# Patient Record
Sex: FEMALE | Race: WHITE | NOT HISPANIC OR LATINO | Employment: STUDENT | URBAN - NONMETROPOLITAN AREA
[De-identification: names, ages, dates, MRNs, and addresses within clinical notes are randomized per-mention and may not be internally consistent; named-entity substitution may affect disease eponyms.]

---
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
## 2020-06-23 PROCEDURE — 99285 EMERGENCY DEPT VISIT HI MDM: CPT

## 2020-06-23 PROCEDURE — 99284 EMERGENCY DEPT VISIT MOD MDM: CPT

## 2020-06-24 ENCOUNTER — HOSPITAL ENCOUNTER (EMERGENCY)
Facility: HOSPITAL | Age: 20
Discharge: HOME | End: 2020-06-24
Admitting: FAMILY MEDICINE

## 2020-06-24 ENCOUNTER — APPOINTMENT (OUTPATIENT)
Dept: GENERAL RADIOLOGY | Facility: HOSPITAL | Age: 20
End: 2020-06-24

## 2020-06-24 VITALS
OXYGEN SATURATION: 100 % | TEMPERATURE: 98.4 F | HEIGHT: 62 IN | DIASTOLIC BLOOD PRESSURE: 55 MMHG | RESPIRATION RATE: 20 BRPM | SYSTOLIC BLOOD PRESSURE: 102 MMHG | HEART RATE: 84 BPM | WEIGHT: 107 LBS | BODY MASS INDEX: 19.69 KG/M2

## 2020-06-24 DIAGNOSIS — R55 POSTURAL SYNCOPE: Primary | ICD-10-CM

## 2020-06-24 LAB
A-A DO2: 0.5 MMHG (ref 0–300)
ALBUMIN SERPL-MCNC: 4.58 G/DL (ref 3.5–5.2)
ALBUMIN/GLOB SERPL: 1.5 G/DL
ALP SERPL-CCNC: 58 U/L (ref 39–117)
ALT SERPL W P-5'-P-CCNC: 37 U/L (ref 1–33)
ANION GAP SERPL CALCULATED.3IONS-SCNC: 14.8 MMOL/L (ref 5–15)
ARTERIAL PATENCY WRIST A: ABNORMAL
AST SERPL-CCNC: 26 U/L (ref 1–32)
ATMOSPHERIC PRESS: 724 MMHG
BACTERIA UR QL AUTO: ABNORMAL /HPF
BASE EXCESS BLDA CALC-SCNC: -3.4 MMOL/L (ref 0–2)
BASOPHILS # BLD AUTO: 0.06 10*3/MM3 (ref 0–0.2)
BASOPHILS NFR BLD AUTO: 0.4 % (ref 0–1.5)
BDY SITE: ABNORMAL
BILIRUB SERPL-MCNC: 0.3 MG/DL (ref 0.2–1.2)
BILIRUB UR QL STRIP: NEGATIVE
BODY TEMPERATURE: 0 C
BUN BLD-MCNC: 8 MG/DL (ref 6–20)
BUN/CREAT SERPL: 16.7 (ref 7–25)
CALCIUM SPEC-SCNC: 9.8 MG/DL (ref 8.6–10.5)
CHLORIDE SERPL-SCNC: 100 MMOL/L (ref 98–107)
CLARITY UR: ABNORMAL
CO2 BLDA-SCNC: 18.7 MMOL/L (ref 22–33)
CO2 SERPL-SCNC: 20.2 MMOL/L (ref 22–29)
COHGB MFR BLD: 0.4 % (ref 0–5)
COLOR UR: YELLOW
CREAT BLD-MCNC: 0.48 MG/DL (ref 0.57–1)
DEPRECATED RDW RBC AUTO: 40.8 FL (ref 37–54)
EOSINOPHIL # BLD AUTO: 0.04 10*3/MM3 (ref 0–0.4)
EOSINOPHIL NFR BLD AUTO: 0.3 % (ref 0.3–6.2)
ERYTHROCYTE [DISTWIDTH] IN BLOOD BY AUTOMATED COUNT: 12.4 % (ref 12.3–15.4)
GFR SERPL CREATININE-BSD FRML MDRD: >150 ML/MIN/1.73
GLOBULIN UR ELPH-MCNC: 3.1 GM/DL
GLUCOSE BLD-MCNC: 86 MG/DL (ref 65–99)
GLUCOSE UR STRIP-MCNC: NEGATIVE MG/DL
HCO3 BLDA-SCNC: 18 MMOL/L (ref 20–26)
HCT VFR BLD AUTO: 35.7 % (ref 34–46.6)
HCT VFR BLD CALC: 37 % (ref 38–51)
HGB BLD-MCNC: 12 G/DL (ref 12–15.9)
HGB BLDA-MCNC: 12.1 G/DL (ref 13.5–17.5)
HGB UR QL STRIP.AUTO: NEGATIVE
HOROWITZ INDEX BLD+IHG-RTO: 21 %
HYALINE CASTS UR QL AUTO: ABNORMAL /LPF
IMM GRANULOCYTES # BLD AUTO: 0.06 10*3/MM3 (ref 0–0.05)
IMM GRANULOCYTES NFR BLD AUTO: 0.4 % (ref 0–0.5)
KETONES UR QL STRIP: ABNORMAL
LEUKOCYTE ESTERASE UR QL STRIP.AUTO: ABNORMAL
LYMPHOCYTES # BLD AUTO: 2.79 10*3/MM3 (ref 0.7–3.1)
LYMPHOCYTES NFR BLD AUTO: 20.6 % (ref 19.6–45.3)
Lab: ABNORMAL
MCH RBC QN AUTO: 30.1 PG (ref 26.6–33)
MCHC RBC AUTO-ENTMCNC: 33.6 G/DL (ref 31.5–35.7)
MCV RBC AUTO: 89.5 FL (ref 79–97)
METHGB BLD QL: 0.4 % (ref 0–3)
MODALITY: ABNORMAL
MONOCYTES # BLD AUTO: 0.71 10*3/MM3 (ref 0.1–0.9)
MONOCYTES NFR BLD AUTO: 5.2 % (ref 5–12)
NEUTROPHILS # BLD AUTO: 9.9 10*3/MM3 (ref 1.7–7)
NEUTROPHILS NFR BLD AUTO: 73.1 % (ref 42.7–76)
NITRITE UR QL STRIP: NEGATIVE
NOTE: ABNORMAL
NRBC BLD AUTO-RTO: 0 /100 WBC (ref 0–0.2)
OXYHGB MFR BLDV: 98.4 % (ref 94–99)
PCO2 BLDA: 22.7 MM HG (ref 35–45)
PCO2 TEMP ADJ BLD: ABNORMAL MM[HG]
PH BLDA: 7.51 PH UNITS (ref 7.35–7.45)
PH UR STRIP.AUTO: 8.5 [PH] (ref 5–8)
PH, TEMP CORRECTED: ABNORMAL
PLATELET # BLD AUTO: 310 10*3/MM3 (ref 140–450)
PMV BLD AUTO: 9.4 FL (ref 6–12)
PO2 BLDA: 116 MM HG (ref 83–108)
PO2 TEMP ADJ BLD: ABNORMAL MM[HG]
POTASSIUM BLD-SCNC: 3.3 MMOL/L (ref 3.5–5.2)
PROT SERPL-MCNC: 7.7 G/DL (ref 6–8.5)
PROT UR QL STRIP: NEGATIVE
RBC # BLD AUTO: 3.99 10*6/MM3 (ref 3.77–5.28)
RBC # UR: ABNORMAL /HPF
REF LAB TEST METHOD: ABNORMAL
SAO2 % BLDCOA: 99.2 % (ref 94–99)
SODIUM BLD-SCNC: 135 MMOL/L (ref 136–145)
SP GR UR STRIP: 1.03 (ref 1–1.03)
SQUAMOUS #/AREA URNS HPF: ABNORMAL /HPF
TROPONIN T SERPL-MCNC: <0.01 NG/ML (ref 0–0.03)
UROBILINOGEN UR QL STRIP: ABNORMAL
VENTILATOR MODE: ABNORMAL
WBC NRBC COR # BLD: 13.56 10*3/MM3 (ref 3.4–10.8)
WBC UR QL AUTO: ABNORMAL /HPF

## 2020-06-24 PROCEDURE — 93010 ELECTROCARDIOGRAM REPORT: CPT | Performed by: INTERNAL MEDICINE

## 2020-06-24 PROCEDURE — 81001 URINALYSIS AUTO W/SCOPE: CPT | Performed by: NURSE PRACTITIONER

## 2020-06-24 PROCEDURE — 93005 ELECTROCARDIOGRAM TRACING: CPT | Performed by: NURSE PRACTITIONER

## 2020-06-24 PROCEDURE — 36600 WITHDRAWAL OF ARTERIAL BLOOD: CPT

## 2020-06-24 PROCEDURE — 82805 BLOOD GASES W/O2 SATURATION: CPT

## 2020-06-24 PROCEDURE — 82375 ASSAY CARBOXYHB QUANT: CPT

## 2020-06-24 PROCEDURE — 85025 COMPLETE CBC W/AUTO DIFF WBC: CPT | Performed by: NURSE PRACTITIONER

## 2020-06-24 PROCEDURE — 83050 HGB METHEMOGLOBIN QUAN: CPT

## 2020-06-24 PROCEDURE — 84484 ASSAY OF TROPONIN QUANT: CPT | Performed by: NURSE PRACTITIONER

## 2020-06-24 PROCEDURE — 80053 COMPREHEN METABOLIC PANEL: CPT | Performed by: NURSE PRACTITIONER

## 2020-06-24 RX ORDER — SODIUM CHLORIDE 0.9 % (FLUSH) 0.9 %
10 SYRINGE (ML) INJECTION AS NEEDED
Status: DISCONTINUED | OUTPATIENT
Start: 2020-06-24 | End: 2020-06-24 | Stop reason: HOSPADM

## 2020-06-24 RX ADMIN — SODIUM CHLORIDE 1000 ML: 9 INJECTION, SOLUTION INTRAVENOUS at 03:33

## 2020-06-24 NOTE — ED NOTES
EKG performed at this time, given to Dr. Blevins for review.     Alyssa Cottrell RN  06/24/20 0402

## 2020-06-24 NOTE — ED NOTES
Provider at bedside at this time updating pt on results and plan to discharge home, all questions and concerns addressed, understanding verbalized.     Leyla Berry, RNA  06/24/20 0442

## 2020-06-24 NOTE — ED NOTES
Per provider, pt is to finish fluid bolus prior to discharge.     Alyssa Cottrell RN  06/24/20 0500

## 2020-06-24 NOTE — ED PROVIDER NOTES
Subjective   The patient is a 19 year old female presenting to ED for complaint of syncopal episodes. She says she is 13 weeks pregnant. She has been having episodes of fatigue. She denies any injury during syncopal episodes, she says she lays in bed before she actually passes out. She denies any pain, she says when this happens she has palpitations.       History provided by:  Patient   used: No    Syncope   Episode history:  Multiple  Most recent episode:  Today  Timing:  Intermittent  Progression:  Waxing and waning  Chronicity:  Recurrent  Associated symptoms: malaise/fatigue, palpitations and shortness of breath    Risk factors: no congenital heart disease, no coronary artery disease, no seizures and no vascular disease        Review of Systems   Constitutional: Positive for fatigue and malaise/fatigue.   HENT: Negative.    Eyes: Negative.    Respiratory: Positive for shortness of breath.    Cardiovascular: Positive for palpitations and syncope.   Endocrine: Negative.    Genitourinary: Negative.    Musculoskeletal: Negative.    Skin: Negative.    Allergic/Immunologic: Negative.    Neurological: Positive for syncope.   Hematological: Negative.    Psychiatric/Behavioral: The patient is nervous/anxious.    All other systems reviewed and are negative.      No past medical history on file.    Allergies   Allergen Reactions   • Morphine And Related    • Bactrim [Sulfamethoxazole-Trimethoprim] Rash     Eye swelling        Past Surgical History:   Procedure Laterality Date   • ENDOSCOPY         No family history on file.    Social History     Socioeconomic History   • Marital status: Single     Spouse name: Not on file   • Number of children: Not on file   • Years of education: Not on file   • Highest education level: Not on file   Tobacco Use   • Smoking status: Never Smoker           Objective   Physical Exam   Constitutional: She is oriented to person, place, and time. She appears well-developed  and well-nourished.   HENT:   Head: Normocephalic.   Mouth/Throat: Oropharynx is clear and moist.   Eyes: Pupils are equal, round, and reactive to light.   Neck: Normal range of motion.   Cardiovascular: Normal rate, regular rhythm, normal heart sounds and intact distal pulses.   Pulmonary/Chest: Effort normal.   Abdominal: Soft. Bowel sounds are normal.   Musculoskeletal: Normal range of motion.        Right lower leg: Normal.        Left lower leg: Normal.   Neurological: She is alert and oriented to person, place, and time.   Skin: Skin is warm and dry.   Psychiatric: She has a normal mood and affect. Her behavior is normal.   Nursing note and vitals reviewed.      Procedures           ED Course  ED Course as of Jun 24 0439 Wed Jun 24, 2020   0339 Patient declines CXR due to pregnancy    [KK]   0436 EKG reviewed by Dr. Blevins normal sinus rhythm ventricular rate 74 UT interval 142 ms QRS duration 88 ms QT/QTc 394/437 ms no evidence of acute ischemia no change compared to previous EKG   ECG 12 Lead [KK]      ED Course User Index  [KK] Manuel Ruffin APRN                                           MDM  Number of Diagnoses or Management Options  Postural syncope: new and requires workup     Amount and/or Complexity of Data Reviewed  Clinical lab tests: ordered and reviewed  Tests in the medicine section of CPT®: ordered and reviewed    Risk of Complications, Morbidity, and/or Mortality  Presenting problems: moderate  Diagnostic procedures: moderate  Management options: moderate  General comments: Labs reviewed and unremarkable  Vital signs stable  Patient afebrile  EKG reviewed and is normal  Patient to follow-up closely with OB/GYN    Patient Progress  Patient progress: improved      Final diagnoses:   Postural syncope            Manuel Ruffin APRN  06/24/20 043

## 2024-11-14 NOTE — PROGRESS NOTES
Subjective     Date: 11/19/2024    Referring Provider  MARLI Courtney    Chief Complaint  Iron deficiency     Subjective          Brittany Truong is a 24 y.o. female who presents today to [facility] for initial evaluation .    HPI:   24 y.o.female presents for consultation due to history of iron deficiency anemia.    Patient reports history of iron deficiency anemia for many years, not requiring IV iron or transfusion. Has required oral iron during pregnancies, more recently has been taking oral ferrous sulfate 325 mg QD daily. Associated with nausea, constipation, and abdominal cramping.     Her iron deficiency has been attributed to menstrual cycle, recently had IUD placed with control of her menstrual cycles. Has monthly menses lasting 3-5 days, light bleeding. Denies any evidence of GIB.    Had endoscopy when she was younger, unable to recall specifics and the year. Admits to having pagophagia throughout the day, fatigue, dyspnea on exertion.    Non smoker, denies alcohol or drug use. Family history significant for mother with ovarian and breast cancer (did not have genetic testing done). Works as a RN at [employer].           The following portions of the patient's history were reviewed and updated as appropriate: allergies, current medications, past family history, past medical history, past social history, past surgical history and problem list.    Objective     Objective     Allergy:   Allergies   Allergen Reactions    Promethazine-Phenylephrine Itching    Dexamethasone Sod Phos-Bupiv Unknown - Low Severity     Syncope, seizure activity    Morphine And Codeine     Bactrim [Sulfamethoxazole-Trimethoprim] Rash     Eye swelling         Current Medications:   Current Outpatient Medications   Medication Sig Dispense Refill    Cariprazine HCl (Vraylar) 1.5 MG capsule capsule Take 1 capsule by mouth Daily.      ferrous sulfate 325 (65 FE) MG tablet Take 1  "tablet by mouth Daily With Breakfast.      sertraline (ZOLOFT) 25 MG tablet Take 1 tablet by mouth Daily.       No current facility-administered medications for this visit.       Past Medical History:  Past Medical History:   Diagnosis Date    Anemia     Depression     Ovarian cyst        Past Surgical History:  Past Surgical History:   Procedure Laterality Date     SECTION      ENDOSCOPY      KNEE SURGERY      TONSILLECTOMY Bilateral     WISDOM TOOTH EXTRACTION         Social History:  Social History     Socioeconomic History    Marital status: Single   Tobacco Use    Smoking status: Never   Vaping Use    Vaping status: Never Used   Substance and Sexual Activity    Alcohol use: Never    Drug use: Never    Sexual activity: Defer     Partners: Female         Family History:  Family History   Problem Relation Age of Onset    Cancer Mother     Hypertension Mother     Hypertension Father        Review of Systems:  Review of Systems   Respiratory:  Positive for shortness of breath.    All other systems reviewed and are negative.      Vital Signs:   /78   Pulse 93   Temp 98 °F (36.7 °C) (Temporal)   Resp 20   Ht 157.5 cm (62\")   Wt 65.8 kg (145 lb)   SpO2 99%   BMI 26.52 kg/m²      Physical Exam:  Physical Exam  Vitals reviewed.   Constitutional:       General: She is not in acute distress.     Appearance: Normal appearance. She is not ill-appearing.   HENT:      Head: Normocephalic and atraumatic.      Mouth/Throat:      Mouth: Mucous membranes are moist.      Pharynx: Oropharynx is clear.   Eyes:      Conjunctiva/sclera: Conjunctivae normal.      Pupils: Pupils are equal, round, and reactive to light.   Cardiovascular:      Rate and Rhythm: Normal rate and regular rhythm.      Heart sounds: No murmur heard.  Pulmonary:      Effort: Pulmonary effort is normal. No respiratory distress.      Breath sounds: Normal breath sounds. No wheezing.   Abdominal:      General: Abdomen is flat. Bowel sounds are " "normal. There is no distension.      Palpations: Abdomen is soft. There is no mass.      Tenderness: There is no abdominal tenderness. There is no guarding.   Musculoskeletal:         General: No swelling. Normal range of motion.      Cervical back: Normal range of motion.   Lymphadenopathy:      Cervical: No cervical adenopathy.   Skin:     General: Skin is warm and dry.   Neurological:      General: No focal deficit present.      Mental Status: She is alert and oriented to person, place, and time. Mental status is at baseline.   Psychiatric:         Mood and Affect: Mood normal.         PHQ-9 Score  PHQ-9 Total Score:       Pain Score  Vitals:    11/19/24 1425   BP: 116/78   Pulse: 93   Resp: 20   Temp: 98 °F (36.7 °C)   TempSrc: Temporal   SpO2: 99%   Weight: 65.8 kg (145 lb)   Height: 157.5 cm (62\")   PainSc: 0-No pain       ECOG score: 0           PAINSCOREQUALITYMETRIC:   Vitals:    11/19/24 1425   PainSc: 0-No pain          Lab Review  Scanned Labs   11/5/2024            Lab Results   Component Value Date    WBC 8.51 11/19/2024    HGB 13.3 11/19/2024    HCT 41.1 11/19/2024    MCV 88.4 11/19/2024    RDW 12.5 11/19/2024     11/19/2024    NEUTRORELPCT 69.7 11/19/2024    LYMPHORELPCT 21.6 11/19/2024    MONORELPCT 6.3 11/19/2024    EOSRELPCT 1.2 11/19/2024    BASORELPCT 0.8 11/19/2024    NEUTROABS 5.93 11/19/2024    LYMPHSABS 1.84 11/19/2024     Lab Results   Component Value Date     (L) 06/24/2020    K 3.3 (L) 06/24/2020    CO2 20.2 (L) 06/24/2020     06/24/2020    BUN 8 06/24/2020    CREATININE 0.48 (L) 06/24/2020    EGFRIFNONA >150 06/24/2020    EGFRIFAFRI  11/29/2017      Comment:      Unable to calculate GFR, patient age <=18.    GLUCOSE 86 06/24/2020    CALCIUM 9.8 06/24/2020    ALKPHOS 58 06/24/2020    AST 26 06/24/2020    ALT 37 (H) 06/24/2020    BILITOT 0.3 06/24/2020    ALBUMIN 4.58 06/24/2020    PROTEINTOT 7.7 06/24/2020       Radiology Results  CT Abdomen Pelvis With Contrast " (11/29/2017 21:51)   FINDINGS:  LOWER THORAX: Clear. No effusions.  ABDOMEN:     LIVER: NONSPECIFIC PERIPORTAL EDEMA OTHERWISE HOMOGENEOUS APPEARANCE  OF LIVER.     GALLBLADDER: NONDISTENDED OR CONTRACTED APPEARANCE OF THE GALLBLADDER  IS NOTED     PANCREAS: Unremarkable. No mass or ductal dilatation.     SPLEEN: Homogeneous. No splenomegaly.     ADRENALS: No mass.     KIDNEYS: No mass. No obstructive uropathy.  No evidence of  urolithiasis.     GI TRACT: Non-dilated. No definite wall thickening.     PERITONEUM: FREE FLUID IS SEEN THROUGHOUT THE LOWER PELVIS. THIS  COULD BE PHYSIOLOGIC IN ETIOLOGY OR SECONDARY TO NONSPECIFIC  INFLAMMATORY PROCESS OF THE ABDOMEN.     MESENTERY: Unremarkable.     LYMPH NODES: No lymphadenopathy.     VASCULATURE: No evidence of aneurysm.     ABDOMINAL WALL: No focal hernia or mass.     OTHER: None.  PELVIS:     BLADDER: MILD WALL THICKENING OF URINARY BLADDER POSSIBLY CYSTITIS.     REPRODUCTIVE: Unremarkable as visualized.     APPENDIX: Nondistended. No surrounding inflammation.     BONES: No acute bony abnormality.     IMPRESSION:  1. GALLBLADDER WALL THICKENING BUT INCOMPLETE DISTENTION. THIS COULD BE  SECONDARY TO NONSPECIFIC INFLAMMATORY PROCESS WITHIN THE ABDOMEN.  CONSIDER FOLLOW-UP WITH ULTRASOUND TO EXCLUDE CHOLECYSTITIS.  2. MILD PERIPORTAL EDEMA INVOLVING LIVER WHICH CAN ALSO BE SECONDARY TO  NONSPECIFIC INTRA-ABDOMINAL INFLAMMATORY PROCESS.  3. FREE FLUID LOWER PELVIS MAY BE PHYSIOLOGIC.  4. OTHERWISE UNREMARKABLE EXAM.        Pathology:   EGD (07/11/2018)  FINDINGS:   The  radiograph shows bowel gas present in a nonobstructive pattern. There is no evidence of   abnormal calcification, organomegaly, or abdominal mass. The osseous structures are normal.   Swallowing is grossly normal. The esophagus shows normal contour, caliber and motility. The stomach   appears normal. The duodenal sweep is normal in configuration. However, there is a round   well-circumscribed  "smoothly marginated nearly 2 cm diverticulum arising from the right border of the    third-fourth duodenal junction, lying right of the spine at the L1-L2 level. The duodenojejunal   junction is normal in position. No gastroesophageal reflux occurred during the examination.     IMPRESSION:  1.  No findings of SMA syndrome or other anatomic abnormality leading to obstruction.   2.  Nearly 2 cm diverticulum arising from the junction of the third and fourth duodenum.     Endoscopy:     Assessment / Plan         Assessment and Plan   Brittany Truong is a 24 y.o. presents for     Anemia   Iron deficiency   Malabsorption   -Patient reports years of iron deficiency anemia, more recently has been on oral ferrous sulfate 325 mg daily. Associated with fatigue, shortness of breath on exertion, and pagophagia.   -Denies previous IV iron or blood transfusions.   -Previously attributed to menorrhagia, has IUD in place with \"light\" bleeding of cycles lasting 3-5 days.   -Denies GIB. Last endoscopy in 2018  -We discussed that the differential for anemia is pretty broad.  It could be related to iron deficiency, chronic inflammation or due to chronic disease processes, vitamin deficiencies, hemolysis, or other underlying bone marrow disorder  -Check CBC with differential, peripheral smear, iron profile, ferritin, folate level, vitamin B12, methylmalonic acid, LDH, reticulocytosis, haptoglobin, TSH  -given patient has had difficulty with PATRICE, despite being on oral iron for >1 year. Would recommend parenteral iron at this time, will order for Monoferric        Discussed possible differential diagnoses, testing, treatment, recommended non-pharmacological interventions, risks, warning signs to monitor for that would indicate need for follow-up in clinic or ER. If no improvement with these regimens or you have new or worsening symptoms follow-up. Patient verbalizes understanding and agreement with plan of care. Denies further needs or " concerns.     Patient was given instructions and counseling regarding her condition and for health maintenance advised.       All questions were answered to her satisfaction.    BMI cannot be calculated due to outdated height or weight values.  Please input a current height/weight in Vitals and re-renter BMIFOLLOWUP in Note to pull in correct documentation based on BMI range.         ACO / MAGO/Other  Quality measures  -  Brittany Truong did not receive 2024 flu vaccine.  This was recommended.  -  Brittany Truong reports a pain score of 0.  Given her pain assessment as noted, treatment options were discussed and the following options were decided upon as a follow-up plan to address the patient's pain: continuation of current treatment plan for pain.  -  Current outpatient and discharge medications have been reconciled for the patient.  Reviewed by: Jesenia Jasso MD        Meds ordered during this visit  No orders of the defined types were placed in this encounter.      Visit Diagnoses    ICD-10-CM ICD-9-CM   1. Anemia, unspecified type  D64.9 285.9   2. Malabsorption due to intolerance, not elsewhere classified  K90.49 579.8   3. Iron deficiency anemia, unspecified iron deficiency anemia type  D50.9 280.9       Follow Up   In 3 months with CBC, Iron studies, B12, folate, ferritin, STR        This document has been electronically signed by Jesenia Jasso MD   November 19, 2024 15:37 EST    Dictated Utilizing Dragon Dictation: Part of this note may be an electronic transcription/translation of spoken language to printed text using the Dragon Dictation System.

## 2024-11-19 ENCOUNTER — CONSULT (OUTPATIENT)
Dept: ONCOLOGY | Facility: CLINIC | Age: 24
End: 2024-11-19
Payer: COMMERCIAL

## 2024-11-19 ENCOUNTER — LAB (OUTPATIENT)
Dept: ONCOLOGY | Facility: CLINIC | Age: 24
End: 2024-11-19
Payer: COMMERCIAL

## 2024-11-19 VITALS
HEART RATE: 93 BPM | RESPIRATION RATE: 20 BRPM | WEIGHT: 145 LBS | SYSTOLIC BLOOD PRESSURE: 116 MMHG | TEMPERATURE: 98 F | OXYGEN SATURATION: 99 % | HEIGHT: 62 IN | DIASTOLIC BLOOD PRESSURE: 78 MMHG | BODY MASS INDEX: 26.68 KG/M2

## 2024-11-19 DIAGNOSIS — D64.9 ANEMIA, UNSPECIFIED TYPE: ICD-10-CM

## 2024-11-19 DIAGNOSIS — D64.9 ANEMIA, UNSPECIFIED TYPE: Primary | ICD-10-CM

## 2024-11-19 DIAGNOSIS — D50.9 IRON DEFICIENCY ANEMIA, UNSPECIFIED IRON DEFICIENCY ANEMIA TYPE: ICD-10-CM

## 2024-11-19 DIAGNOSIS — K90.49 MALABSORPTION DUE TO INTOLERANCE, NOT ELSEWHERE CLASSIFIED: ICD-10-CM

## 2024-11-19 LAB
ALBUMIN SERPL-MCNC: 4.7 G/DL (ref 3.5–5.2)
ALBUMIN/GLOB SERPL: 1.4 G/DL
ALP SERPL-CCNC: 103 U/L (ref 39–117)
ALT SERPL W P-5'-P-CCNC: 60 U/L (ref 1–33)
ANION GAP SERPL CALCULATED.3IONS-SCNC: 11.4 MMOL/L (ref 5–15)
AST SERPL-CCNC: 25 U/L (ref 1–32)
BASOPHILS # BLD AUTO: 0.07 10*3/MM3 (ref 0–0.2)
BASOPHILS NFR BLD AUTO: 0.8 % (ref 0–1.5)
BILIRUB SERPL-MCNC: 0.3 MG/DL (ref 0–1.2)
BUN SERPL-MCNC: 12 MG/DL (ref 6–20)
BUN/CREAT SERPL: 16.7 (ref 7–25)
CALCIUM SPEC-SCNC: 9.7 MG/DL (ref 8.6–10.5)
CHLORIDE SERPL-SCNC: 104 MMOL/L (ref 98–107)
CO2 SERPL-SCNC: 23.6 MMOL/L (ref 22–29)
CREAT SERPL-MCNC: 0.72 MG/DL (ref 0.57–1)
DEPRECATED RDW RBC AUTO: 40.3 FL (ref 37–54)
EGFRCR SERPLBLD CKD-EPI 2021: 119.2 ML/MIN/1.73
EOSINOPHIL # BLD AUTO: 0.1 10*3/MM3 (ref 0–0.4)
EOSINOPHIL NFR BLD AUTO: 1.2 % (ref 0.3–6.2)
ERYTHROCYTE [DISTWIDTH] IN BLOOD BY AUTOMATED COUNT: 12.5 % (ref 12.3–15.4)
FERRITIN SERPL-MCNC: 29.95 NG/ML (ref 13–150)
GLOBULIN UR ELPH-MCNC: 3.3 GM/DL
GLUCOSE SERPL-MCNC: 93 MG/DL (ref 65–99)
HCT VFR BLD AUTO: 41.1 % (ref 34–46.6)
HGB BLD-MCNC: 13.3 G/DL (ref 12–15.9)
IMM GRANULOCYTES # BLD AUTO: 0.03 10*3/MM3 (ref 0–0.05)
IMM GRANULOCYTES NFR BLD AUTO: 0.4 % (ref 0–0.5)
IRON 24H UR-MRATE: 76 MCG/DL (ref 37–145)
IRON SATN MFR SERPL: 16 % (ref 20–50)
LDH SERPL-CCNC: 223 U/L (ref 135–214)
LYMPHOCYTES # BLD AUTO: 1.84 10*3/MM3 (ref 0.7–3.1)
LYMPHOCYTES NFR BLD AUTO: 21.6 % (ref 19.6–45.3)
MCH RBC QN AUTO: 28.6 PG (ref 26.6–33)
MCHC RBC AUTO-ENTMCNC: 32.4 G/DL (ref 31.5–35.7)
MCV RBC AUTO: 88.4 FL (ref 79–97)
MONOCYTES # BLD AUTO: 0.54 10*3/MM3 (ref 0.1–0.9)
MONOCYTES NFR BLD AUTO: 6.3 % (ref 5–12)
NEUTROPHILS NFR BLD AUTO: 5.93 10*3/MM3 (ref 1.7–7)
NEUTROPHILS NFR BLD AUTO: 69.7 % (ref 42.7–76)
NRBC BLD AUTO-RTO: 0 /100 WBC (ref 0–0.2)
PLATELET # BLD AUTO: 351 10*3/MM3 (ref 140–450)
PMV BLD AUTO: 9 FL (ref 6–12)
POTASSIUM SERPL-SCNC: 4.2 MMOL/L (ref 3.5–5.2)
PROT SERPL-MCNC: 8 G/DL (ref 6–8.5)
RBC # BLD AUTO: 4.65 10*6/MM3 (ref 3.77–5.28)
RETICS # AUTO: 0.06 10*6/MM3 (ref 0.02–0.13)
RETICS/RBC NFR AUTO: 1.25 % (ref 0.7–1.9)
SODIUM SERPL-SCNC: 139 MMOL/L (ref 136–145)
TIBC SERPL-MCNC: 489 MCG/DL (ref 298–536)
TRANSFERRIN SERPL-MCNC: 328 MG/DL (ref 200–360)
TSH SERPL DL<=0.05 MIU/L-ACNC: 2.04 UIU/ML (ref 0.27–4.2)
WBC NRBC COR # BLD AUTO: 8.51 10*3/MM3 (ref 3.4–10.8)

## 2024-11-19 PROCEDURE — 85045 AUTOMATED RETICULOCYTE COUNT: CPT | Performed by: INTERNAL MEDICINE

## 2024-11-19 PROCEDURE — 84466 ASSAY OF TRANSFERRIN: CPT | Performed by: INTERNAL MEDICINE

## 2024-11-19 PROCEDURE — 82746 ASSAY OF FOLIC ACID SERUM: CPT | Performed by: INTERNAL MEDICINE

## 2024-11-19 PROCEDURE — 82728 ASSAY OF FERRITIN: CPT | Performed by: INTERNAL MEDICINE

## 2024-11-19 PROCEDURE — 82607 VITAMIN B-12: CPT | Performed by: INTERNAL MEDICINE

## 2024-11-19 PROCEDURE — 84238 ASSAY NONENDOCRINE RECEPTOR: CPT | Performed by: INTERNAL MEDICINE

## 2024-11-19 PROCEDURE — 83010 ASSAY OF HAPTOGLOBIN QUANT: CPT | Performed by: INTERNAL MEDICINE

## 2024-11-19 PROCEDURE — 83921 ORGANIC ACID SINGLE QUANT: CPT | Performed by: INTERNAL MEDICINE

## 2024-11-19 PROCEDURE — 83615 LACTATE (LD) (LDH) ENZYME: CPT | Performed by: INTERNAL MEDICINE

## 2024-11-19 PROCEDURE — 83540 ASSAY OF IRON: CPT | Performed by: INTERNAL MEDICINE

## 2024-11-19 PROCEDURE — 80050 GENERAL HEALTH PANEL: CPT | Performed by: INTERNAL MEDICINE

## 2024-11-19 RX ORDER — SERTRALINE HYDROCHLORIDE 25 MG/1
25 TABLET, FILM COATED ORAL DAILY
COMMUNITY

## 2024-11-19 RX ORDER — FAMOTIDINE 10 MG/ML
20 INJECTION, SOLUTION INTRAVENOUS AS NEEDED
Status: CANCELLED | OUTPATIENT
Start: 2024-11-19

## 2024-11-19 RX ORDER — SODIUM CHLORIDE 9 MG/ML
20 INJECTION, SOLUTION INTRAVENOUS ONCE
Status: CANCELLED | OUTPATIENT
Start: 2024-11-19

## 2024-11-19 RX ORDER — FERROUS SULFATE 325(65) MG
325 TABLET ORAL
COMMUNITY

## 2024-11-19 RX ORDER — DIPHENHYDRAMINE HYDROCHLORIDE 50 MG/ML
50 INJECTION INTRAMUSCULAR; INTRAVENOUS AS NEEDED
Status: CANCELLED | OUTPATIENT
Start: 2024-11-19

## 2024-11-19 NOTE — PROGRESS NOTES
Venipuncture Blood Specimen Collection  Venipuncture performed in right arm by Shelby Ann MA with good hemostasis. Patient tolerated the procedure well without complications.   11/19/24   Shelby Ann MA

## 2024-11-20 DIAGNOSIS — D50.9 IRON DEFICIENCY ANEMIA, UNSPECIFIED IRON DEFICIENCY ANEMIA TYPE: ICD-10-CM

## 2024-11-20 DIAGNOSIS — K90.49 MALABSORPTION DUE TO INTOLERANCE, NOT ELSEWHERE CLASSIFIED: ICD-10-CM

## 2024-11-20 DIAGNOSIS — D64.9 ANEMIA, UNSPECIFIED TYPE: Primary | ICD-10-CM

## 2024-11-20 LAB
FOLATE SERPL-MCNC: 18 NG/ML (ref 4.78–24.2)
HAPTOGLOB SERPL-MCNC: 185 MG/DL (ref 30–200)
VIT B12 BLD-MCNC: 474 PG/ML (ref 211–946)

## 2024-11-21 DIAGNOSIS — D50.9 IRON DEFICIENCY ANEMIA, UNSPECIFIED IRON DEFICIENCY ANEMIA TYPE: ICD-10-CM

## 2024-11-21 DIAGNOSIS — K90.49 MALABSORPTION DUE TO INTOLERANCE, NOT ELSEWHERE CLASSIFIED: Primary | ICD-10-CM

## 2024-11-21 LAB — REF LAB TEST METHOD: NORMAL

## 2024-11-21 RX ORDER — SODIUM CHLORIDE 9 MG/ML
20 INJECTION, SOLUTION INTRAVENOUS ONCE
OUTPATIENT
Start: 2024-11-21

## 2024-11-21 RX ORDER — FAMOTIDINE 10 MG/ML
20 INJECTION, SOLUTION INTRAVENOUS AS NEEDED
OUTPATIENT
Start: 2024-11-21

## 2024-11-21 RX ORDER — DIPHENHYDRAMINE HYDROCHLORIDE 50 MG/ML
50 INJECTION INTRAMUSCULAR; INTRAVENOUS AS NEEDED
OUTPATIENT
Start: 2024-11-21

## 2024-11-21 RX ORDER — DIPHENHYDRAMINE HYDROCHLORIDE 50 MG/ML
50 INJECTION INTRAMUSCULAR; INTRAVENOUS AS NEEDED
OUTPATIENT
Start: 2024-11-27

## 2024-11-21 RX ORDER — FAMOTIDINE 10 MG/ML
20 INJECTION, SOLUTION INTRAVENOUS AS NEEDED
OUTPATIENT
Start: 2024-11-27

## 2024-11-21 RX ORDER — SODIUM CHLORIDE 9 MG/ML
20 INJECTION, SOLUTION INTRAVENOUS ONCE
OUTPATIENT
Start: 2024-11-27

## 2024-11-22 LAB — STFR SERPL-SCNC: 23.1 NMOL/L (ref 12.2–27.3)

## 2024-11-25 ENCOUNTER — PATIENT ROUNDING (BHMG ONLY) (OUTPATIENT)
Dept: ONCOLOGY | Facility: CLINIC | Age: 24
End: 2024-11-25
Payer: COMMERCIAL

## 2024-11-25 LAB — METHYLMALONATE SERPL-SCNC: 109 NMOL/L (ref 0–378)

## 2024-11-27 ENCOUNTER — INFUSION (OUTPATIENT)
Dept: ONCOLOGY | Facility: HOSPITAL | Age: 24
End: 2024-11-27
Payer: COMMERCIAL

## 2024-11-27 VITALS
RESPIRATION RATE: 18 BRPM | TEMPERATURE: 97.5 F | BODY MASS INDEX: 27.05 KG/M2 | OXYGEN SATURATION: 100 % | WEIGHT: 147.9 LBS | SYSTOLIC BLOOD PRESSURE: 104 MMHG | HEART RATE: 77 BPM | DIASTOLIC BLOOD PRESSURE: 66 MMHG

## 2024-11-27 DIAGNOSIS — D50.9 IRON DEFICIENCY ANEMIA, UNSPECIFIED IRON DEFICIENCY ANEMIA TYPE: Primary | ICD-10-CM

## 2024-11-27 DIAGNOSIS — K90.49 MALABSORPTION DUE TO INTOLERANCE, NOT ELSEWHERE CLASSIFIED: ICD-10-CM

## 2024-11-27 PROCEDURE — 96374 THER/PROPH/DIAG INJ IV PUSH: CPT

## 2024-11-27 PROCEDURE — 25010000002 FERUMOXYTOL 510 MG/17ML SOLUTION: Performed by: INTERNAL MEDICINE

## 2024-11-27 PROCEDURE — 96365 THER/PROPH/DIAG IV INF INIT: CPT

## 2024-11-27 RX ORDER — SODIUM CHLORIDE 9 MG/ML
20 INJECTION, SOLUTION INTRAVENOUS ONCE
Status: DISCONTINUED | OUTPATIENT
Start: 2024-11-27 | End: 2024-11-27 | Stop reason: RX

## 2024-11-27 RX ADMIN — FERUMOXYTOL 510 MG: 510 INJECTION INTRAVENOUS at 15:02

## 2024-12-02 ENCOUNTER — INFUSION (OUTPATIENT)
Dept: ONCOLOGY | Facility: HOSPITAL | Age: 24
End: 2024-12-02
Payer: COMMERCIAL

## 2024-12-02 VITALS
TEMPERATURE: 98.2 F | BODY MASS INDEX: 27.11 KG/M2 | RESPIRATION RATE: 18 BRPM | OXYGEN SATURATION: 99 % | DIASTOLIC BLOOD PRESSURE: 72 MMHG | WEIGHT: 148.2 LBS | HEART RATE: 95 BPM | SYSTOLIC BLOOD PRESSURE: 117 MMHG

## 2024-12-02 DIAGNOSIS — D50.9 IRON DEFICIENCY ANEMIA, UNSPECIFIED IRON DEFICIENCY ANEMIA TYPE: Primary | ICD-10-CM

## 2024-12-02 DIAGNOSIS — K90.49 MALABSORPTION DUE TO INTOLERANCE, NOT ELSEWHERE CLASSIFIED: ICD-10-CM

## 2024-12-02 PROCEDURE — 25010000002 FERUMOXYTOL 510 MG/17ML SOLUTION: Performed by: INTERNAL MEDICINE

## 2024-12-02 PROCEDURE — 96365 THER/PROPH/DIAG IV INF INIT: CPT

## 2024-12-02 PROCEDURE — 96374 THER/PROPH/DIAG INJ IV PUSH: CPT

## 2024-12-02 PROCEDURE — 96376 TX/PRO/DX INJ SAME DRUG ADON: CPT

## 2024-12-02 PROCEDURE — 96375 TX/PRO/DX INJ NEW DRUG ADDON: CPT

## 2024-12-02 PROCEDURE — 63710000001 ONDANSETRON PER 8 MG

## 2024-12-02 PROCEDURE — 25010000002 DIPHENHYDRAMINE PER 50 MG

## 2024-12-02 RX ORDER — SODIUM CHLORIDE 9 MG/ML
20 INJECTION, SOLUTION INTRAVENOUS ONCE
Status: DISCONTINUED | OUTPATIENT
Start: 2024-12-02 | End: 2024-12-02

## 2024-12-02 RX ORDER — DIPHENHYDRAMINE HYDROCHLORIDE 50 MG/ML
25 INJECTION INTRAMUSCULAR; INTRAVENOUS ONCE
Status: COMPLETED | OUTPATIENT
Start: 2024-12-02 | End: 2024-12-02

## 2024-12-02 RX ORDER — ONDANSETRON 4 MG/1
4 TABLET, FILM COATED ORAL ONCE
Status: COMPLETED | OUTPATIENT
Start: 2024-12-02 | End: 2024-12-02

## 2024-12-02 RX ADMIN — DIPHENHYDRAMINE HYDROCHLORIDE 25 MG: 50 INJECTION, SOLUTION INTRAMUSCULAR; INTRAVENOUS at 16:25

## 2024-12-02 RX ADMIN — ONDANSETRON HYDROCHLORIDE 4 MG: 4 TABLET, FILM COATED ORAL at 15:26

## 2024-12-02 RX ADMIN — DIPHENHYDRAMINE HYDROCHLORIDE 25 MG: 50 INJECTION, SOLUTION INTRAMUSCULAR; INTRAVENOUS at 15:54

## 2024-12-02 RX ADMIN — FERUMOXYTOL 510 MG: 510 INJECTION INTRAVENOUS at 15:30

## 2024-12-12 ENCOUNTER — APPOINTMENT (OUTPATIENT)
Dept: ONCOLOGY | Facility: HOSPITAL | Age: 24
End: 2024-12-12
Payer: COMMERCIAL

## 2025-02-24 NOTE — PROGRESS NOTES
Subjective     Date: 2/25/2025    Referring Provider  No ref. provider found    Chief Complaint  Iron deficiency     Subjective          Brittany Truong is a 24 y.o. female who presents today to [facility] for follow up.    HPI:   24 y.o.female presents for consultation due to history of iron deficiency anemia.    Patient reports history of iron deficiency anemia for many years, not requiring IV iron or transfusion. Has required oral iron during pregnancies, more recently has been taking oral ferrous sulfate 325 mg QD daily. Associated with nausea, constipation, and abdominal cramping.     Her iron deficiency has been attributed to menstrual cycle, recently had IUD placed with control of her menstrual cycles. Has monthly menses lasting 3-5 days, light bleeding. Denies any evidence of GIB.    Had endoscopy when she was younger, unable to recall specifics and the year. Admits to having pagophagia throughout the day, fatigue, dyspnea on exertion.    Non smoker, denies alcohol or drug use. Family history significant for mother with ovarian and breast cancer (did not have genetic testing done). Works as a RN at [employer].     Treatment History:        Interval History 02/25/2025   Ms. Castro presents for follow up. She received Ferumoxytol (insurance preferred) 11/27/24 and 12/2/2024. She reports oral swelling during the second dose of Ferumoxytol. Resolved with benadryl, was able to be discharged home. Did endorse improvement with her energy after IV iron.   CBC today with Hgb 13.6, Hct 41.8. Ferritin 325 ng/mL.     Has been taking over the counter B12 supplement.         Objective     Objective     Allergy:   Allergies   Allergen Reactions    Promethazine-Phenylephrine Itching    Dexamethasone Other (See Comments)    Dexamethasone Sod Phos-Bupiv Unknown - Low Severity     Syncope, seizure activity    Morphine And Codeine     Bactrim [Sulfamethoxazole-Trimethoprim] Rash  "    Eye swelling         Current Medications:   Current Outpatient Medications   Medication Sig Dispense Refill    Cariprazine HCl (Vraylar) 1.5 MG capsule capsule Take 1 capsule by mouth Daily.      sertraline (ZOLOFT) 25 MG tablet Take 1 tablet by mouth Daily.       No current facility-administered medications for this visit.       Past Medical History:  Past Medical History:   Diagnosis Date    Anemia     Depression     Ovarian cyst        Past Surgical History:  Past Surgical History:   Procedure Laterality Date     SECTION      ENDOSCOPY      KNEE SURGERY      TONSILLECTOMY Bilateral     WISDOM TOOTH EXTRACTION         Social History:  Social History     Socioeconomic History    Marital status: Single   Tobacco Use    Smoking status: Never   Vaping Use    Vaping status: Never Used   Substance and Sexual Activity    Alcohol use: Never    Drug use: Never    Sexual activity: Defer     Partners: Female         Family History:  Family History   Problem Relation Age of Onset    Cancer Mother     Hypertension Mother     Hypertension Father        Review of Systems:  Review of Systems   Respiratory:  Positive for shortness of breath.    All other systems reviewed and are negative.      Vital Signs:   /74   Pulse 113   Temp 98 °F (36.7 °C) (Temporal)   Resp 20   Ht 157.5 cm (62\")   Wt 69.7 kg (153 lb 9.6 oz)   SpO2 96%   BMI 28.09 kg/m²      Physical Exam:  Physical Exam  Vitals reviewed.   Constitutional:       General: She is not in acute distress.     Appearance: Normal appearance. She is not ill-appearing.   HENT:      Head: Normocephalic and atraumatic.      Mouth/Throat:      Mouth: Mucous membranes are moist.      Pharynx: Oropharynx is clear.   Eyes:      Conjunctiva/sclera: Conjunctivae normal.      Pupils: Pupils are equal, round, and reactive to light.   Cardiovascular:      Rate and Rhythm: Normal rate and regular rhythm.      Heart sounds: No murmur heard.  Pulmonary:      Effort: " "Pulmonary effort is normal. No respiratory distress.      Breath sounds: Normal breath sounds. No wheezing.   Abdominal:      General: Abdomen is flat. Bowel sounds are normal. There is no distension.      Palpations: Abdomen is soft. There is no mass.      Tenderness: There is no abdominal tenderness. There is no guarding.   Musculoskeletal:         General: No swelling. Normal range of motion.      Cervical back: Normal range of motion.   Lymphadenopathy:      Cervical: No cervical adenopathy.   Skin:     General: Skin is warm and dry.   Neurological:      General: No focal deficit present.      Mental Status: She is alert and oriented to person, place, and time. Mental status is at baseline.   Psychiatric:         Mood and Affect: Mood normal.         PHQ-9 Score  PHQ-9 Total Score:       Pain Score  Vitals:    02/25/25 1233   BP: 123/74   Pulse: 113   Resp: 20   Temp: 98 °F (36.7 °C)   TempSrc: Temporal   SpO2: 96%   Weight: 69.7 kg (153 lb 9.6 oz)   Height: 157.5 cm (62\")   PainSc: 0-No pain         ECOG score: 0           PAINSCOREQUALITYMETRIC:   Vitals:    02/25/25 1233   PainSc: 0-No pain            Lab Review  Scanned Labs   11/5/2024            Lab Results   Component Value Date    WBC 8.67 02/25/2025    HGB 13.6 02/25/2025    HCT 41.8 02/25/2025    MCV 91.7 02/25/2025    RDW 12.2 (L) 02/25/2025     02/25/2025    NEUTRORELPCT 63.9 02/25/2025    LYMPHORELPCT 27.0 02/25/2025    MONORELPCT 6.2 02/25/2025    EOSRELPCT 1.8 02/25/2025    BASORELPCT 0.8 02/25/2025    NEUTROABS 5.53 02/25/2025    LYMPHSABS 2.34 02/25/2025     Lab Results   Component Value Date     11/19/2024    K 4.2 11/19/2024    CO2 23.6 11/19/2024     11/19/2024    BUN 12 11/19/2024    CREATININE 0.72 11/19/2024    EGFRIFNONA >150 06/24/2020    EGFRIFAFRI  11/29/2017      Comment:      Unable to calculate GFR, patient age <=18.    GLUCOSE 93 11/19/2024    CALCIUM 9.7 11/19/2024    ALKPHOS 103 11/19/2024    AST 25 11/19/2024 "    ALT 60 (H) 11/19/2024    BILITOT 0.3 11/19/2024    ALBUMIN 4.7 11/19/2024    PROTEINTOT 8.0 11/19/2024       Radiology Results  CT Abdomen Pelvis With Contrast (11/29/2017 21:51)   FINDINGS:  LOWER THORAX: Clear. No effusions.  ABDOMEN:     LIVER: NONSPECIFIC PERIPORTAL EDEMA OTHERWISE HOMOGENEOUS APPEARANCE  OF LIVER.     GALLBLADDER: NONDISTENDED OR CONTRACTED APPEARANCE OF THE GALLBLADDER  IS NOTED     PANCREAS: Unremarkable. No mass or ductal dilatation.     SPLEEN: Homogeneous. No splenomegaly.     ADRENALS: No mass.     KIDNEYS: No mass. No obstructive uropathy.  No evidence of  urolithiasis.     GI TRACT: Non-dilated. No definite wall thickening.     PERITONEUM: FREE FLUID IS SEEN THROUGHOUT THE LOWER PELVIS. THIS  COULD BE PHYSIOLOGIC IN ETIOLOGY OR SECONDARY TO NONSPECIFIC  INFLAMMATORY PROCESS OF THE ABDOMEN.     MESENTERY: Unremarkable.     LYMPH NODES: No lymphadenopathy.     VASCULATURE: No evidence of aneurysm.     ABDOMINAL WALL: No focal hernia or mass.     OTHER: None.  PELVIS:     BLADDER: MILD WALL THICKENING OF URINARY BLADDER POSSIBLY CYSTITIS.     REPRODUCTIVE: Unremarkable as visualized.     APPENDIX: Nondistended. No surrounding inflammation.     BONES: No acute bony abnormality.     IMPRESSION:  1. GALLBLADDER WALL THICKENING BUT INCOMPLETE DISTENTION. THIS COULD BE  SECONDARY TO NONSPECIFIC INFLAMMATORY PROCESS WITHIN THE ABDOMEN.  CONSIDER FOLLOW-UP WITH ULTRASOUND TO EXCLUDE CHOLECYSTITIS.  2. MILD PERIPORTAL EDEMA INVOLVING LIVER WHICH CAN ALSO BE SECONDARY TO  NONSPECIFIC INTRA-ABDOMINAL INFLAMMATORY PROCESS.  3. FREE FLUID LOWER PELVIS MAY BE PHYSIOLOGIC.  4. OTHERWISE UNREMARKABLE EXAM.        Pathology:   EGD (07/11/2018)  FINDINGS:   The  radiograph shows bowel gas present in a nonobstructive pattern. There is no evidence of   abnormal calcification, organomegaly, or abdominal mass. The osseous structures are normal.   Swallowing is grossly normal. The esophagus shows normal  "contour, caliber and motility. The stomach   appears normal. The duodenal sweep is normal in configuration. However, there is a round   well-circumscribed smoothly marginated nearly 2 cm diverticulum arising from the right border of the    third-fourth duodenal junction, lying right of the spine at the L1-L2 level. The duodenojejunal   junction is normal in position. No gastroesophageal reflux occurred during the examination.     IMPRESSION:  1.  No findings of SMA syndrome or other anatomic abnormality leading to obstruction.   2.  Nearly 2 cm diverticulum arising from the junction of the third and fourth duodenum.     Endoscopy:     Assessment / Plan         Assessment and Plan   Brittany Truong is a 24 y.o. presents for     Anemia   Iron deficiency   Malabsorption   -Patient reports years of iron deficiency anemia, refractory to oral iron supplements. Associated with fatigue, shortness of breath on exertion, and pagophagia.   -Previously attributed to menorrhagia, has IUD in place with \"light\" bleeding of cycles lasting 3-5 days.   -Denies GIB. Last endoscopy in 2018  -given patient has had difficulty with PATRICE, despite being on oral iron for >1 year. Would recommend parenteral iron at this time, will order for Monoferric however Ferumoxytol was preferred by insurance, received 11/27 and 12/2/2024. Unfortunately had an allergic reaction with second dose with oral edema, resolved with benadryl. If IV iron is required, would consider ferric carboxymaltose   -Iron studies with replete iron at this time, hold off on further parenteral iron at this time    4. Low B12   - Patient is currently on OTC oral B12 supplement         Discussed possible differential diagnoses, testing, treatment, recommended non-pharmacological interventions, risks, warning signs to monitor for that would indicate need for follow-up in clinic or ER. If no improvement with these regimens or you have new or worsening symptoms follow-up. Patient " verbalizes understanding and agreement with plan of care. Denies further needs or concerns.     Patient was given instructions and counseling regarding her condition and for health maintenance advised.       All questions were answered to her satisfaction.    BMI cannot be calculated due to outdated height or weight values.  Please input a current height/weight in Vitals and re-renter BMIFOLLOWUP in Note to pull in correct documentation based on BMI range.         ACO / MAGO/Other  Quality measures  -  Brittany Truong did not receive 2024 flu vaccine.  This was recommended.  -  Brittany Truong reports a pain score of 0.  Given her pain assessment as noted, treatment options were discussed and the following options were decided upon as a follow-up plan to address the patient's pain: continuation of current treatment plan for pain.  -  Current outpatient and discharge medications have been reconciled for the patient.  Reviewed by: Jesenia Jasso MD        Meds ordered during this visit  No orders of the defined types were placed in this encounter.      Visit Diagnoses    ICD-10-CM ICD-9-CM   1. Anemia, unspecified type  D64.9 285.9   2. Iron deficiency anemia, unspecified iron deficiency anemia type  D50.9 280.9   3. Malabsorption due to intolerance, not elsewhere classified  K90.49 579.8   4. B12 deficiency  E53.8 266.2         Follow Up   In 4 months with CBC, Iron studies, B12, folate, ferritin, STR        This document has been electronically signed by Jesenia Jasso MD   February 25, 2025 13:47 EST    Dictated Utilizing Dragon Dictation: Part of this note may be an electronic transcription/translation of spoken language to printed text using the Dragon Dictation System.

## 2025-02-25 ENCOUNTER — LAB (OUTPATIENT)
Dept: ONCOLOGY | Facility: CLINIC | Age: 25
End: 2025-02-25
Payer: COMMERCIAL

## 2025-02-25 ENCOUNTER — OFFICE VISIT (OUTPATIENT)
Dept: ONCOLOGY | Facility: CLINIC | Age: 25
End: 2025-02-25
Payer: COMMERCIAL

## 2025-02-25 VITALS
WEIGHT: 153.6 LBS | RESPIRATION RATE: 20 BRPM | HEART RATE: 113 BPM | TEMPERATURE: 98 F | HEIGHT: 62 IN | OXYGEN SATURATION: 96 % | DIASTOLIC BLOOD PRESSURE: 74 MMHG | SYSTOLIC BLOOD PRESSURE: 123 MMHG | BODY MASS INDEX: 28.26 KG/M2

## 2025-02-25 DIAGNOSIS — D64.9 ANEMIA, UNSPECIFIED TYPE: Primary | ICD-10-CM

## 2025-02-25 DIAGNOSIS — K90.49 MALABSORPTION DUE TO INTOLERANCE, NOT ELSEWHERE CLASSIFIED: ICD-10-CM

## 2025-02-25 DIAGNOSIS — D50.9 IRON DEFICIENCY ANEMIA, UNSPECIFIED IRON DEFICIENCY ANEMIA TYPE: ICD-10-CM

## 2025-02-25 DIAGNOSIS — E53.8 B12 DEFICIENCY: ICD-10-CM

## 2025-02-25 DIAGNOSIS — D64.9 ANEMIA, UNSPECIFIED TYPE: ICD-10-CM

## 2025-02-25 LAB
BASOPHILS # BLD AUTO: 0.07 10*3/MM3 (ref 0–0.2)
BASOPHILS NFR BLD AUTO: 0.8 % (ref 0–1.5)
DEPRECATED RDW RBC AUTO: 41 FL (ref 37–54)
EOSINOPHIL # BLD AUTO: 0.16 10*3/MM3 (ref 0–0.4)
EOSINOPHIL NFR BLD AUTO: 1.8 % (ref 0.3–6.2)
ERYTHROCYTE [DISTWIDTH] IN BLOOD BY AUTOMATED COUNT: 12.2 % (ref 12.3–15.4)
FERRITIN SERPL-MCNC: 325.5 NG/ML (ref 13–150)
HCT VFR BLD AUTO: 41.8 % (ref 34–46.6)
HGB BLD-MCNC: 13.6 G/DL (ref 12–15.9)
IMM GRANULOCYTES # BLD AUTO: 0.03 10*3/MM3 (ref 0–0.05)
IMM GRANULOCYTES NFR BLD AUTO: 0.3 % (ref 0–0.5)
IRON 24H UR-MRATE: 111 MCG/DL (ref 37–145)
IRON SATN MFR SERPL: 28 % (ref 20–50)
LYMPHOCYTES # BLD AUTO: 2.34 10*3/MM3 (ref 0.7–3.1)
LYMPHOCYTES NFR BLD AUTO: 27 % (ref 19.6–45.3)
MCH RBC QN AUTO: 29.8 PG (ref 26.6–33)
MCHC RBC AUTO-ENTMCNC: 32.5 G/DL (ref 31.5–35.7)
MCV RBC AUTO: 91.7 FL (ref 79–97)
MONOCYTES # BLD AUTO: 0.54 10*3/MM3 (ref 0.1–0.9)
MONOCYTES NFR BLD AUTO: 6.2 % (ref 5–12)
NEUTROPHILS NFR BLD AUTO: 5.53 10*3/MM3 (ref 1.7–7)
NEUTROPHILS NFR BLD AUTO: 63.9 % (ref 42.7–76)
NRBC BLD AUTO-RTO: 0 /100 WBC (ref 0–0.2)
PLATELET # BLD AUTO: 343 10*3/MM3 (ref 140–450)
PMV BLD AUTO: 9 FL (ref 6–12)
RBC # BLD AUTO: 4.56 10*6/MM3 (ref 3.77–5.28)
TIBC SERPL-MCNC: 392 MCG/DL (ref 298–536)
TRANSFERRIN SERPL-MCNC: 263 MG/DL (ref 200–360)
WBC NRBC COR # BLD AUTO: 8.67 10*3/MM3 (ref 3.4–10.8)

## 2025-02-25 PROCEDURE — 84466 ASSAY OF TRANSFERRIN: CPT | Performed by: INTERNAL MEDICINE

## 2025-02-25 PROCEDURE — 85025 COMPLETE CBC W/AUTO DIFF WBC: CPT | Performed by: INTERNAL MEDICINE

## 2025-02-25 PROCEDURE — 83540 ASSAY OF IRON: CPT | Performed by: INTERNAL MEDICINE

## 2025-02-25 PROCEDURE — 84238 ASSAY NONENDOCRINE RECEPTOR: CPT | Performed by: INTERNAL MEDICINE

## 2025-02-25 PROCEDURE — 82607 VITAMIN B-12: CPT | Performed by: INTERNAL MEDICINE

## 2025-02-25 PROCEDURE — 82746 ASSAY OF FOLIC ACID SERUM: CPT | Performed by: INTERNAL MEDICINE

## 2025-02-25 PROCEDURE — 82728 ASSAY OF FERRITIN: CPT | Performed by: INTERNAL MEDICINE

## 2025-02-25 NOTE — PROGRESS NOTES
Venipuncture Blood Specimen Collection  Venipuncture performed in right arm by Shelby Ann MA with good hemostasis. Patient tolerated the procedure well without complications.   02/25/25   Shelby Ann MA

## 2025-02-26 LAB
FOLATE SERPL-MCNC: 19.4 NG/ML (ref 4.78–24.2)
VIT B12 BLD-MCNC: 692 PG/ML (ref 211–946)

## 2025-02-27 LAB — STFR SERPL-SCNC: 16.4 NMOL/L (ref 12.2–27.3)

## 2025-07-22 ENCOUNTER — OFFICE VISIT (OUTPATIENT)
Dept: ONCOLOGY | Facility: CLINIC | Age: 25
End: 2025-07-22
Payer: MEDICAID

## 2025-07-22 ENCOUNTER — LAB (OUTPATIENT)
Dept: ONCOLOGY | Facility: CLINIC | Age: 25
End: 2025-07-22
Payer: MEDICAID

## 2025-07-22 VITALS
DIASTOLIC BLOOD PRESSURE: 80 MMHG | WEIGHT: 151.4 LBS | HEART RATE: 94 BPM | SYSTOLIC BLOOD PRESSURE: 132 MMHG | OXYGEN SATURATION: 99 % | TEMPERATURE: 97 F | HEIGHT: 62 IN | RESPIRATION RATE: 20 BRPM | BODY MASS INDEX: 27.86 KG/M2

## 2025-07-22 DIAGNOSIS — D50.9 IRON DEFICIENCY ANEMIA, UNSPECIFIED IRON DEFICIENCY ANEMIA TYPE: ICD-10-CM

## 2025-07-22 DIAGNOSIS — K90.49 MALABSORPTION DUE TO INTOLERANCE, NOT ELSEWHERE CLASSIFIED: ICD-10-CM

## 2025-07-22 DIAGNOSIS — E53.8 B12 DEFICIENCY: ICD-10-CM

## 2025-07-22 DIAGNOSIS — D64.9 ANEMIA, UNSPECIFIED TYPE: ICD-10-CM

## 2025-07-22 DIAGNOSIS — D64.9 ANEMIA, UNSPECIFIED TYPE: Primary | ICD-10-CM

## 2025-07-22 LAB
BASOPHILS # BLD AUTO: 0.09 10*3/MM3 (ref 0–0.2)
BASOPHILS NFR BLD AUTO: 0.6 % (ref 0–1.5)
DEPRECATED RDW RBC AUTO: 41.7 FL (ref 37–54)
EOSINOPHIL # BLD AUTO: 0.11 10*3/MM3 (ref 0–0.4)
EOSINOPHIL NFR BLD AUTO: 0.8 % (ref 0.3–6.2)
ERYTHROCYTE [DISTWIDTH] IN BLOOD BY AUTOMATED COUNT: 12.5 % (ref 12.3–15.4)
FERRITIN SERPL-MCNC: 357 NG/ML (ref 13–150)
HCT VFR BLD AUTO: 42.1 % (ref 34–46.6)
HGB BLD-MCNC: 13.8 G/DL (ref 12–15.9)
IMM GRANULOCYTES # BLD AUTO: 0.06 10*3/MM3 (ref 0–0.05)
IMM GRANULOCYTES NFR BLD AUTO: 0.4 % (ref 0–0.5)
IRON 24H UR-MRATE: 142 MCG/DL (ref 37–145)
IRON SATN MFR SERPL: 33 % (ref 20–50)
LYMPHOCYTES # BLD AUTO: 2.46 10*3/MM3 (ref 0.7–3.1)
LYMPHOCYTES NFR BLD AUTO: 16.8 % (ref 19.6–45.3)
MCH RBC QN AUTO: 29.9 PG (ref 26.6–33)
MCHC RBC AUTO-ENTMCNC: 32.8 G/DL (ref 31.5–35.7)
MCV RBC AUTO: 91.3 FL (ref 79–97)
MONOCYTES # BLD AUTO: 0.71 10*3/MM3 (ref 0.1–0.9)
MONOCYTES NFR BLD AUTO: 4.9 % (ref 5–12)
NEUTROPHILS NFR BLD AUTO: 11.18 10*3/MM3 (ref 1.7–7)
NEUTROPHILS NFR BLD AUTO: 76.5 % (ref 42.7–76)
NRBC BLD AUTO-RTO: 0 /100 WBC (ref 0–0.2)
PLATELET # BLD AUTO: 361 10*3/MM3 (ref 140–450)
PMV BLD AUTO: 9 FL (ref 6–12)
RBC # BLD AUTO: 4.61 10*6/MM3 (ref 3.77–5.28)
TIBC SERPL-MCNC: 428 MCG/DL (ref 298–536)
TRANSFERRIN SERPL-MCNC: 287 MG/DL (ref 200–360)
WBC NRBC COR # BLD AUTO: 14.61 10*3/MM3 (ref 3.4–10.8)

## 2025-07-22 PROCEDURE — 82746 ASSAY OF FOLIC ACID SERUM: CPT | Performed by: INTERNAL MEDICINE

## 2025-07-22 PROCEDURE — 83540 ASSAY OF IRON: CPT | Performed by: INTERNAL MEDICINE

## 2025-07-22 PROCEDURE — 82607 VITAMIN B-12: CPT | Performed by: INTERNAL MEDICINE

## 2025-07-22 PROCEDURE — 85025 COMPLETE CBC W/AUTO DIFF WBC: CPT | Performed by: INTERNAL MEDICINE

## 2025-07-22 PROCEDURE — 84238 ASSAY NONENDOCRINE RECEPTOR: CPT | Performed by: INTERNAL MEDICINE

## 2025-07-22 PROCEDURE — 84466 ASSAY OF TRANSFERRIN: CPT | Performed by: INTERNAL MEDICINE

## 2025-07-22 PROCEDURE — 82728 ASSAY OF FERRITIN: CPT | Performed by: INTERNAL MEDICINE

## 2025-07-22 RX ORDER — ARIPIPRAZOLE 10 MG/1
10 TABLET ORAL DAILY
COMMUNITY

## 2025-07-22 NOTE — PROGRESS NOTES
Venipuncture Blood Specimen Collection  Venipuncture performed in right arm by Shelby Ann MA with good hemostasis. Patient tolerated the procedure well without complications.   07/22/25   Shelby Ann MA

## 2025-07-22 NOTE — PROGRESS NOTES
Subjective     Date: 7/23/2025    Referring Provider  No ref. provider found    Chief Complaint  Iron deficiency     Subjective          Brittany Truong is a 24 y.o. female who presents today to [facility] for follow up.    HPI:   24 y.o.female presents for consultation due to history of iron deficiency anemia.    Patient reports history of iron deficiency anemia for many years, not requiring IV iron or transfusion. Has required oral iron during pregnancies, more recently has been taking oral ferrous sulfate 325 mg QD daily. Associated with nausea, constipation, and abdominal cramping.     Her iron deficiency has been attributed to menstrual cycle, recently had IUD placed with control of her menstrual cycles. Has monthly menses lasting 3-5 days, light bleeding. Denies any evidence of GIB.    Had endoscopy when she was younger, unable to recall specifics and the year. Admits to having pagophagia throughout the day, fatigue, dyspnea on exertion.    Non smoker, denies alcohol or drug use. Family history significant for mother with ovarian and breast cancer (did not have genetic testing done). Works as a RN at [employer].     Treatment History:        Interval History 02/25/2025   Ms. Castro presents for follow up. She received Ferumoxytol (insurance preferred) 11/27/24 and 12/2/2024. She reports oral swelling during the second dose of Ferumoxytol. Resolved with benadryl, was able to be discharged home. Did endorse improvement with her energy after IV iron.   CBC today with Hgb 13.6, Hct 41.8. Ferritin 325 ng/mL.     Has been taking over the counter B12 supplement.     Interval History 06/24/2025   Brittany presents for f/u. She reports fatigue, and thinks it may be due to low iron stores. She denies frequent menstrual cycles, if she does have them, lasting only a day. Denies any other source of bleed. Requests one time dose of IV iron if needed due to previous SE. Denies  "fever, chills, dysuria, URI. Not taking OTC B12.  CBC: /42/361. MCV 91. Ferritin 357 ng/mL. Folate 11. B12 617.         Objective     Objective     Allergy:   Allergies   Allergen Reactions    Promethazine-Phenylephrine Itching    Dexamethasone Other (See Comments)    Dexamethasone Sod Phos-Bupiv Unknown - Low Severity     Syncope, seizure activity    Morphine And Codeine     Bactrim [Sulfamethoxazole-Trimethoprim] Rash     Eye swelling         Current Medications:   Current Outpatient Medications   Medication Sig Dispense Refill    Abilify 10 MG tablet Take 1 tablet by mouth Daily.      FLUoxetine (PROzac) 20 MG capsule Take 1 capsule by mouth Daily.      Cariprazine HCl (Vraylar) 1.5 MG capsule capsule Take 1 capsule by mouth Daily.      sertraline (ZOLOFT) 25 MG tablet Take 1 tablet by mouth Daily.       No current facility-administered medications for this visit.       Past Medical History:  Past Medical History:   Diagnosis Date    Anemia     Depression     Ovarian cyst        Past Surgical History:  Past Surgical History:   Procedure Laterality Date     SECTION      ENDOSCOPY      KNEE SURGERY      TONSILLECTOMY Bilateral     WISDOM TOOTH EXTRACTION         Social History:  Social History     Socioeconomic History    Marital status: Single   Tobacco Use    Smoking status: Never   Vaping Use    Vaping status: Never Used   Substance and Sexual Activity    Alcohol use: Never    Drug use: Never    Sexual activity: Defer     Partners: Female         Family History:  Family History   Problem Relation Age of Onset    Cancer Mother     Hypertension Mother     Hypertension Father        Review of Systems:  Review of Systems   Constitutional:  Positive for fatigue.   Respiratory:  Positive for shortness of breath.    All other systems reviewed and are negative.      Vital Signs:   /80   Pulse 94   Temp 97 °F (36.1 °C) (Temporal)   Resp 20   Ht 157.5 cm (62\")   Wt 68.7 kg (151 lb 6.4 oz)   SpO2 " "99%   BMI 27.69 kg/m²      Physical Exam:  Physical Exam  Vitals reviewed.   Constitutional:       General: She is not in acute distress.     Appearance: Normal appearance. She is not ill-appearing.   HENT:      Head: Normocephalic and atraumatic.      Mouth/Throat:      Mouth: Mucous membranes are moist.      Pharynx: Oropharynx is clear.   Eyes:      Conjunctiva/sclera: Conjunctivae normal.      Pupils: Pupils are equal, round, and reactive to light.   Cardiovascular:      Rate and Rhythm: Normal rate and regular rhythm.      Heart sounds: No murmur heard.  Pulmonary:      Effort: Pulmonary effort is normal. No respiratory distress.      Breath sounds: Normal breath sounds. No wheezing.   Abdominal:      General: Abdomen is flat. Bowel sounds are normal. There is no distension.      Palpations: Abdomen is soft. There is no mass.      Tenderness: There is no abdominal tenderness. There is no guarding.   Musculoskeletal:         General: No swelling. Normal range of motion.      Cervical back: Normal range of motion.   Lymphadenopathy:      Cervical: No cervical adenopathy.   Skin:     General: Skin is warm and dry.   Neurological:      General: No focal deficit present.      Mental Status: She is alert and oriented to person, place, and time. Mental status is at baseline.   Psychiatric:         Mood and Affect: Mood normal.         PHQ-9 Score  PHQ-9 Total Score:       Pain Score  Vitals:    07/22/25 1611   BP: 132/80   Pulse: 94   Resp: 20   Temp: 97 °F (36.1 °C)   TempSrc: Temporal   SpO2: 99%   Weight: 68.7 kg (151 lb 6.4 oz)   Height: 157.5 cm (62\")   PainSc: 0-No pain           ECOG score: 0           PAINSCOREQUALITYMETRIC:   Vitals:    07/22/25 1611   PainSc: 0-No pain              Lab Review  Scanned Labs   11/5/2024            Lab Results   Component Value Date    WBC 14.61 (H) 07/22/2025    HGB 13.8 07/22/2025    HCT 42.1 07/22/2025    MCV 91.3 07/22/2025    RDW 12.5 07/22/2025     07/22/2025    " NEUTRORELPCT 76.5 (H) 07/22/2025    LYMPHORELPCT 16.8 (L) 07/22/2025    MONORELPCT 4.9 (L) 07/22/2025    EOSRELPCT 0.8 07/22/2025    BASORELPCT 0.6 07/22/2025    NEUTROABS 11.18 (H) 07/22/2025    LYMPHSABS 2.46 07/22/2025     Lab Results   Component Value Date     11/19/2024    K 4.2 11/19/2024    CO2 23.6 11/19/2024     11/19/2024    BUN 12 11/19/2024    CREATININE 0.72 11/19/2024    EGFRIFNONA >150 06/24/2020    EGFRIFAFRI  11/29/2017      Comment:      Unable to calculate GFR, patient age <=18.    GLUCOSE 93 11/19/2024    CALCIUM 9.7 11/19/2024    ALKPHOS 103 11/19/2024    AST 25 11/19/2024    ALT 60 (H) 11/19/2024    BILITOT 0.3 11/19/2024    ALBUMIN 4.7 11/19/2024    PROTEINTOT 8.0 11/19/2024       Radiology Results  CT Abdomen Pelvis With Contrast (11/29/2017 21:51)   FINDINGS:  LOWER THORAX: Clear. No effusions.  ABDOMEN:     LIVER: NONSPECIFIC PERIPORTAL EDEMA OTHERWISE HOMOGENEOUS APPEARANCE  OF LIVER.     GALLBLADDER: NONDISTENDED OR CONTRACTED APPEARANCE OF THE GALLBLADDER  IS NOTED     PANCREAS: Unremarkable. No mass or ductal dilatation.     SPLEEN: Homogeneous. No splenomegaly.     ADRENALS: No mass.     KIDNEYS: No mass. No obstructive uropathy.  No evidence of  urolithiasis.     GI TRACT: Non-dilated. No definite wall thickening.     PERITONEUM: FREE FLUID IS SEEN THROUGHOUT THE LOWER PELVIS. THIS  COULD BE PHYSIOLOGIC IN ETIOLOGY OR SECONDARY TO NONSPECIFIC  INFLAMMATORY PROCESS OF THE ABDOMEN.     MESENTERY: Unremarkable.     LYMPH NODES: No lymphadenopathy.     VASCULATURE: No evidence of aneurysm.     ABDOMINAL WALL: No focal hernia or mass.     OTHER: None.  PELVIS:     BLADDER: MILD WALL THICKENING OF URINARY BLADDER POSSIBLY CYSTITIS.     REPRODUCTIVE: Unremarkable as visualized.     APPENDIX: Nondistended. No surrounding inflammation.     BONES: No acute bony abnormality.     IMPRESSION:  1. GALLBLADDER WALL THICKENING BUT INCOMPLETE DISTENTION. THIS COULD BE  SECONDARY TO  "NONSPECIFIC INFLAMMATORY PROCESS WITHIN THE ABDOMEN.  CONSIDER FOLLOW-UP WITH ULTRASOUND TO EXCLUDE CHOLECYSTITIS.  2. MILD PERIPORTAL EDEMA INVOLVING LIVER WHICH CAN ALSO BE SECONDARY TO  NONSPECIFIC INTRA-ABDOMINAL INFLAMMATORY PROCESS.  3. FREE FLUID LOWER PELVIS MAY BE PHYSIOLOGIC.  4. OTHERWISE UNREMARKABLE EXAM.        Pathology:   EGD (07/11/2018)  FINDINGS:   The  radiograph shows bowel gas present in a nonobstructive pattern. There is no evidence of   abnormal calcification, organomegaly, or abdominal mass. The osseous structures are normal.   Swallowing is grossly normal. The esophagus shows normal contour, caliber and motility. The stomach   appears normal. The duodenal sweep is normal in configuration. However, there is a round   well-circumscribed smoothly marginated nearly 2 cm diverticulum arising from the right border of the    third-fourth duodenal junction, lying right of the spine at the L1-L2 level. The duodenojejunal   junction is normal in position. No gastroesophageal reflux occurred during the examination.     IMPRESSION:  1.  No findings of SMA syndrome or other anatomic abnormality leading to obstruction.   2.  Nearly 2 cm diverticulum arising from the junction of the third and fourth duodenum.     Endoscopy:     Assessment / Plan         Assessment and Plan   Brittany Truong is a 24 y.o. presents for     Anemia   Iron deficiency   Malabsorption   -Patient reports years of iron deficiency anemia, refractory to oral iron supplements. Associated with fatigue, shortness of breath on exertion, and pagophagia.   -Previously attributed to menorrhagia, has IUD in place with \"light\" bleeding of cycles lasting 3-5 days.   -Denies GIB. Last endoscopy in 2018  -given patient has had difficulty with PATRICE, despite being on oral iron for >1 year. Would recommend parenteral iron at this time, will order for Monoferric however Ferumoxytol was preferred by insurance, received 11/27 and 12/2/2024. " Unfortunately had an allergic reaction with second dose with oral edema, resolved with benadryl. If IV iron is required, would consider ferric carboxymaltose   -Iron studies with replete iron at this time, hold off on further parenteral iron at this time    4. Low B12   - Patient is currently off oral B12 supplementation. B12 is normal today    5. Leukocytosis  - Noted to have WBC 14 with primarily neutrophils  - No infectious signs and symptoms at this time      Discussed possible differential diagnoses, testing, treatment, recommended non-pharmacological interventions, risks, warning signs to monitor for that would indicate need for follow-up in clinic or ER. If no improvement with these regimens or you have new or worsening symptoms follow-up. Patient verbalizes understanding and agreement with plan of care. Denies further needs or concerns.     Patient was given instructions and counseling regarding her condition and for health maintenance advised.       All questions were answered to her satisfaction.    BMI cannot be calculated due to outdated height or weight values.  Please input a current height/weight in Vitals and re-renter BMIFOLLOWUP in Note to pull in correct documentation based on BMI range.         ACO / MAGO/Other  Quality measures  -  Brittany Truong did not receive 2024 flu vaccine.  This was recommended.  -  Brittany Truong reports a pain score of 0.  Given her pain assessment as noted, treatment options were discussed and the following options were decided upon as a follow-up plan to address the patient's pain: continuation of current treatment plan for pain.  -  Current outpatient and discharge medications have been reconciled for the patient.  Reviewed by: Jesenia Jasso MD        Meds ordered during this visit  No orders of the defined types were placed in this encounter.      Visit Diagnoses    ICD-10-CM ICD-9-CM   1. Anemia, unspecified type  D64.9 285.9   2. B12 deficiency  E53.8 266.2   3. Iron  deficiency anemia, unspecified iron deficiency anemia type  D50.9 280.9   4. Malabsorption due to intolerance, not elsewhere classified  K90.49 579.8           Follow Up   In 4 months with CBC, Iron studies, B12, folate, ferritin, STR        This document has been electronically signed by Jesenia Jasso MD   July 23, 2025 08:27 EDT    Dictated Utilizing Dragon Dictation: Part of this note may be an electronic transcription/translation of spoken language to printed text using the Dragon Dictation System.

## 2025-07-23 ENCOUNTER — RESULTS FOLLOW-UP (OUTPATIENT)
Dept: ONCOLOGY | Facility: CLINIC | Age: 25
End: 2025-07-23
Payer: COMMERCIAL

## 2025-07-23 LAB
FOLATE SERPL-MCNC: 11.7 NG/ML (ref 4.78–24.2)
VIT B12 BLD-MCNC: 617 PG/ML (ref 211–946)

## 2025-07-23 NOTE — TELEPHONE ENCOUNTER
----- Message from Jesenia Jasso sent at 7/23/2025  8:10 AM EDT -----  Can you please notify her of normal iron levels. IV iron is not needed at this time.   TY!  ----- Message -----  From: Lab, Background User  Sent: 7/22/2025   4:14 PM EDT  To: Jesenia Jasso MD

## 2025-07-23 NOTE — TELEPHONE ENCOUNTER
RN attempted to contact patient. Unfortunately, RN reached voicemail and left a message for patient informing her of normal iron levels and provided her with office phone number for any questions or concerns.

## 2025-07-26 LAB — STFR SERPL-SCNC: 17.3 NMOL/L (ref 12.2–27.3)
